# Patient Record
Sex: MALE | Race: WHITE | NOT HISPANIC OR LATINO | URBAN - METROPOLITAN AREA
[De-identification: names, ages, dates, MRNs, and addresses within clinical notes are randomized per-mention and may not be internally consistent; named-entity substitution may affect disease eponyms.]

---

## 2017-08-26 ENCOUNTER — EMERGENCY (EMERGENCY)
Facility: HOSPITAL | Age: 47
LOS: 1 days | Discharge: HOME | End: 2017-08-26
Attending: EMERGENCY MEDICINE
Payer: COMMERCIAL

## 2017-08-26 VITALS
SYSTOLIC BLOOD PRESSURE: 144 MMHG | RESPIRATION RATE: 18 BRPM | HEIGHT: 71 IN | TEMPERATURE: 98 F | DIASTOLIC BLOOD PRESSURE: 90 MMHG | HEART RATE: 70 BPM | WEIGHT: 199.96 LBS | OXYGEN SATURATION: 98 %

## 2017-08-26 PROCEDURE — 99284 EMERGENCY DEPT VISIT MOD MDM: CPT | Mod: 25

## 2017-08-26 PROCEDURE — 99053 MED SERV 10PM-8AM 24 HR FAC: CPT

## 2017-08-26 PROCEDURE — 99283 EMERGENCY DEPT VISIT LOW MDM: CPT

## 2017-08-26 RX ORDER — IBUPROFEN 200 MG
1 TABLET ORAL
Qty: 15 | Refills: 0 | OUTPATIENT
Start: 2017-08-26 | End: 2017-08-31

## 2017-08-26 RX ORDER — IBUPROFEN 200 MG
600 TABLET ORAL ONCE
Qty: 0 | Refills: 0 | Status: COMPLETED | OUTPATIENT
Start: 2017-08-26 | End: 2017-08-26

## 2017-08-26 RX ORDER — METHOCARBAMOL 500 MG/1
1 TABLET, FILM COATED ORAL
Qty: 15 | Refills: 0 | OUTPATIENT
Start: 2017-08-26 | End: 2017-08-31

## 2017-08-26 RX ADMIN — Medication 600 MILLIGRAM(S): at 00:51

## 2017-08-26 NOTE — ED PROVIDER NOTE - OBJECTIVE STATEMENT
45 y/o M [employer], had a door shut in his face by a perp yesterday.  He grabbed the door and felt mild right upper back pain.  This am he felt the pain more intensely and radiating to right side of chest.  Wife noticed swelling to the back.  He has not taken any medication.

## 2017-08-26 NOTE — ED PROVIDER NOTE - ATTENDING CONTRIBUTION TO CARE
I personally saw the patinet with the NP, and completed the key components of the history and physical exam.  I have reviewed the NP note and agree with the history, exam, and plan of care, except as noted  gen in nad resp clear cardiac no murmur msk + ttp right upper back neuro: CN II - XII intact, EOMI, PERRL, no papilledema, 5/5 muscle strength x 4 extremities, no sensory deficits, 2+ dtr globally, negative babinski, no ataxic gait, normal LINO and FNT, normal romberg

## 2017-08-26 NOTE — ED PROVIDER NOTE - MEDICAL DECISION MAKING DETAILS
Area of visible muscle spasm right upper thoracic area.  Neurologically intact.  Will rx ibuprofen and robaxin, f/u PCP.

## 2017-08-26 NOTE — ED ADULT NURSE NOTE - OBJECTIVE STATEMENT
Pt c/o back pain x 24 hours.  pt is member of [employer] and states he injured his back forcing open a door last night.